# Patient Record
Sex: MALE | Race: WHITE | NOT HISPANIC OR LATINO | Employment: FULL TIME | URBAN - METROPOLITAN AREA
[De-identification: names, ages, dates, MRNs, and addresses within clinical notes are randomized per-mention and may not be internally consistent; named-entity substitution may affect disease eponyms.]

---

## 2022-04-18 ENCOUNTER — OFFICE VISIT (OUTPATIENT)
Dept: PODIATRY | Facility: CLINIC | Age: 27
End: 2022-04-18

## 2022-04-18 VITALS — BODY MASS INDEX: 22.35 KG/M2 | WEIGHT: 130.94 LBS | HEIGHT: 64 IN

## 2022-04-18 DIAGNOSIS — M76.821 POSTERIOR TIBIAL TENDINITIS OF RIGHT LOWER EXTREMITY: Primary | ICD-10-CM

## 2022-04-18 PROCEDURE — 99203 OFFICE O/P NEW LOW 30 MIN: CPT | Mod: S$PBB,,, | Performed by: PODIATRIST

## 2022-04-18 PROCEDURE — 99999 PR PBB SHADOW E&M-NEW PATIENT-LVL II: CPT | Mod: PBBFAC,,, | Performed by: PODIATRIST

## 2022-04-18 PROCEDURE — 99999 PR PBB SHADOW E&M-NEW PATIENT-LVL II: ICD-10-PCS | Mod: PBBFAC,,, | Performed by: PODIATRIST

## 2022-04-18 PROCEDURE — 99203 PR OFFICE/OUTPT VISIT, NEW, LEVL III, 30-44 MIN: ICD-10-PCS | Mod: S$PBB,,, | Performed by: PODIATRIST

## 2022-04-18 PROCEDURE — 99202 OFFICE O/P NEW SF 15 MIN: CPT | Mod: PBBFAC | Performed by: PODIATRIST

## 2022-04-18 NOTE — PROGRESS NOTES
"Subjective:       Patient ID: Omari Robbins is a 26 y.o. male.    Chief Complaint: Foot Pain (C/o left foot pain, rates pain 3/10, nondiabetic, wearing socks and shoes, last seen PCP Dr. Rollins on 12/13/2012.)      HPI: Omari Robbins presents to the office with the chief complaint of pains to the left foot and ankle. Pains are most notable at the medial aspect of the foot and ankle. The pains are rated as 3/10 and are described as sore and aching in nature. The pains have been on going now for the past several weeks to a month or so, and are worsening. The patient denies trauma. The patient states occasional NSAIDs for management. The patient states that prolonged walking and standing exacerbates and causes the symptoms.  Relates that he was previously running up to 2 miles per day instance having injury, has decreased his running ability due to pain.  States he did discuss this with his and, retired physical therapist, to discuss treatment options.  Has attempted to rest, ice, and use anti-inflammatories.  Did purchase over-the-counter ankle brace which was not provide significant relief.  The patient does state mild associated swelling as well. Patient's Primary Care Provider is Luda Rollins MD.     Review of patient's allergies indicates:  No Known Allergies    History reviewed. No pertinent past medical history.    History reviewed. No pertinent family history.    Social History     Socioeconomic History    Marital status: Single   Tobacco Use    Smoking status: Never Smoker   Substance and Sexual Activity    Alcohol use: No    Drug use: No    Sexual activity: Never       History reviewed. No pertinent surgical history.    Review of Systems      Objective:   Ht 5' 4" (1.626 m)   Wt 59.4 kg (130 lb 15.3 oz)   BMI 22.48 kg/m²     No image results found.      Physical Exam  LOWER EXTREMITY PHYSICAL EXAMINATION  DERMATOLOGY: Skin is supple, dry and intact. No ecchymosis is noted. No hypertrophic " skin formation. No erythema or cellulitis is noted.     VASCULAR: The right dorsalis pedis pulse is 2/4 and the posterior tibial pulse is 2/4. The left dorsalis pedis pulse is 2/4 and the posterior tibial pulse is 2/4. Hair growth is noted on the dorsal foot and digits. Proximal to distal, warm to warm. Capillary refill time is WNL at less than 3s.    NEUROLOGY: Protective sensation is intact via 5.07 [place] Rosalba monofilament. Proprioception is intact. Sensation to light touch is intact. Upon palpation of the interspaces, there are no neurological sensations stated that radiate proximal or distal. Upon compression of the metatarsal heads from medial to lateral, no neurological sensations or symptoms are stated.    ORTHOPEDIC:  Mild collapsing of the left foot.  There is moderate pain on palpation of the navicular tuberosity the insertion of the posterior tibial tendon.  When stressed inversion and dorsiflexion with resistance, the posterior tibial tendon is intact in working appropriately.  Manual muscle strength testing is 5/5.  There is no deficits.  Is no loss of medial longitudinal arch.  There is deep pain on palpation of this area.  There is no pain to the Achilles tendon      Assessment:     1. Posterior tibial tendinitis of right lower extremity        Plan:     Posterior tibial tendinitis of right lower extremity      Thorough discussion is had with the patient today, concerning the diagnosis, its etiology, and the treatment algorithm at present.    Discussed pathology etiology associated with posterior tibial tendinitis.  Appears this is exercise induced due to increased discomfort and swelling.  I would recommend proceeding with conservative therapies.  Based on patient's shoe gear, would recommend orthotics to elevate the medial longitudinal arch take tension off the posterior tibial tendon with running.  Patient also to consider replacement of his current shoe gear which appears to have significant  weakness within the midfoot.    Did discuss proper and supportive shoe gear in detail and at length with the patient.  These are shoes with firm and robust arch support; medial counter.  Shoes which only bend at the metatarsophalangeal joint and which are rigid in the midfoot and hindfoot. Patient urged to purchase running type or cross training type shoes gear which are designed for pronation control.         No future appointments.